# Patient Record
Sex: FEMALE | Race: WHITE | NOT HISPANIC OR LATINO | Employment: OTHER | URBAN - METROPOLITAN AREA
[De-identification: names, ages, dates, MRNs, and addresses within clinical notes are randomized per-mention and may not be internally consistent; named-entity substitution may affect disease eponyms.]

---

## 2017-06-20 NOTE — PATIENT DISCUSSION
Continue glaucoma drops as before but switch to generic (gave hand written rx's to patient today). Discussed Istent procedure (brochure given) at future date of cataract surgery, and he may be able to get off glaucoma drops at that time.

## 2017-11-14 NOTE — PATIENT DISCUSSION
POAG, OU: INTRAOCULAR PRESSURE IS WITHIN ACCEPTABLE LIMITS. PT INSTRUCTED TO CONTINUE Latanoprost and Brimonidine AND RETURN FOR FOLLOW-UP AS SCHEDULED.

## 2018-01-18 ENCOUNTER — ESTABLISHED PATIENT (OUTPATIENT)
Dept: URBAN - METROPOLITAN AREA CLINIC 43 | Facility: CLINIC | Age: 53
End: 2018-01-18

## 2018-01-18 DIAGNOSIS — L92.1: ICD-10-CM

## 2018-01-18 PROCEDURE — 64612C BOTOX / COSMETIC

## 2018-01-18 PROCEDURE — 64612D DYSPORT INJECTION

## 2018-01-18 PROCEDURE — 96999VL VOLLURE

## 2018-02-07 ENCOUNTER — CONSULT (OUTPATIENT)
Dept: URBAN - METROPOLITAN AREA CLINIC 39 | Facility: CLINIC | Age: 53
End: 2018-02-07

## 2018-02-07 DIAGNOSIS — L92.1: ICD-10-CM

## 2018-02-07 PROCEDURE — 96999VL VOLLURE

## 2018-05-01 ENCOUNTER — ESTABLISHED PATIENT (OUTPATIENT)
Dept: URBAN - METROPOLITAN AREA CLINIC 39 | Facility: CLINIC | Age: 53
End: 2018-05-01

## 2018-05-01 DIAGNOSIS — L98.8: ICD-10-CM

## 2018-05-01 PROCEDURE — 64612C BOTOX / COSMETIC

## 2018-05-01 PROCEDURE — 96999VL VOLLURE

## 2018-07-10 ENCOUNTER — ESTABLISHED COMPREHENSIVE EXAM (OUTPATIENT)
Dept: URBAN - METROPOLITAN AREA CLINIC 39 | Facility: CLINIC | Age: 53
End: 2018-07-10

## 2018-07-10 DIAGNOSIS — H11.153: ICD-10-CM

## 2018-07-10 DIAGNOSIS — H04.123: ICD-10-CM

## 2018-07-10 DIAGNOSIS — R51: ICD-10-CM

## 2018-07-10 DIAGNOSIS — H57.12: ICD-10-CM

## 2018-07-10 PROCEDURE — 92015 DETERMINE REFRACTIVE STATE: CPT

## 2018-07-10 PROCEDURE — 92014 COMPRE OPH EXAM EST PT 1/>: CPT

## 2018-07-10 ASSESSMENT — VISUAL ACUITY
OD_SC: J6
OS_SC: 20/200
OD_PH: 20/50
OD_SC: 20/200
OS_SC: J3
OS_PH: 20/40

## 2018-07-10 ASSESSMENT — TONOMETRY
OS_IOP_MMHG: 12
OD_IOP_MMHG: 14

## 2018-07-13 NOTE — PATIENT DISCUSSION
POAG with Elevated IOP Counseling: I have explained the diagnosis of glaucoma and discussed the importance of lowering intraocular pressure to prevent further optic nerve damage and possible blindness. I have discussed the various treatment options including medications, SLT laser and surgery. I have reviewed the regimen of drops with the patient. I emphasized to the patient the importance of compliance with treatment and follow-up appointments. Patient instructed to return for follow-up as scheduled.

## 2018-07-19 ENCOUNTER — CONSULT (OUTPATIENT)
Dept: URBAN - METROPOLITAN AREA CLINIC 43 | Facility: CLINIC | Age: 53
End: 2018-07-19

## 2018-07-19 ENCOUNTER — PREPPED CHART (OUTPATIENT)
Dept: URBAN - METROPOLITAN AREA CLINIC 43 | Facility: CLINIC | Age: 53
End: 2018-07-19

## 2018-07-19 DIAGNOSIS — H57.12: ICD-10-CM

## 2018-07-19 DIAGNOSIS — R51: ICD-10-CM

## 2018-07-19 DIAGNOSIS — H11.153: ICD-10-CM

## 2018-07-19 DIAGNOSIS — L98.8: ICD-10-CM

## 2018-07-19 DIAGNOSIS — H04.123: ICD-10-CM

## 2018-07-19 PROCEDURE — 99499 UNLISTED E&M SERVICE: CPT

## 2018-07-19 PROCEDURE — 96999VL VOLLURE

## 2018-07-19 PROCEDURE — 64612C BOTOX / COSMETIC

## 2018-08-06 ENCOUNTER — FOLLOW UP (OUTPATIENT)
Dept: URBAN - METROPOLITAN AREA CLINIC 39 | Facility: CLINIC | Age: 53
End: 2018-08-06

## 2018-08-06 DIAGNOSIS — L98.8: ICD-10-CM

## 2018-08-06 PROCEDURE — 96999VL VOLLURE

## 2018-08-13 ENCOUNTER — FOLLOW UP (OUTPATIENT)
Dept: URBAN - METROPOLITAN AREA CLINIC 39 | Facility: CLINIC | Age: 53
End: 2018-08-13

## 2018-08-13 DIAGNOSIS — H11.153: ICD-10-CM

## 2018-08-13 DIAGNOSIS — L98.8: ICD-10-CM

## 2018-08-13 DIAGNOSIS — H57.12: ICD-10-CM

## 2018-08-13 DIAGNOSIS — H04.123: ICD-10-CM

## 2018-08-13 DIAGNOSIS — R51: ICD-10-CM

## 2018-08-13 PROCEDURE — G8428 CUR MEDS NOT DOCUMENT: HCPCS

## 2018-08-13 PROCEDURE — G8785 BP SCRN NO PERF AT INTERVAL: HCPCS

## 2018-08-13 PROCEDURE — G9903 PT SCRN TBCO ID AS NON USER: HCPCS

## 2018-08-13 PROCEDURE — G8483 FLU IMM NO ADMIN DOC REA: HCPCS

## 2018-08-13 PROCEDURE — 4040F PNEUMOC VAC/ADMIN/RCVD: CPT

## 2018-08-13 PROCEDURE — 99024 POSTOP FOLLOW-UP VISIT: CPT

## 2018-08-13 PROCEDURE — 1036F TOBACCO NON-USER: CPT

## 2018-08-13 RX ORDER — CEPHALEXIN 500 MG/1
1 CAPSULE ORAL TWICE A DAY
Start: 2018-08-13

## 2018-08-13 RX ORDER — ACETAMINOPHEN AND CODEINE PHOSPHATE 30; 300 MG/1; MG/1
1 TABLET ORAL
Start: 2018-08-13

## 2018-08-13 RX ORDER — TRAMADOL HCL 50 MG/1
1 TABLET ORAL AS NEEDED
Start: 2018-08-13

## 2018-08-14 ENCOUNTER — POST-OP (OUTPATIENT)
Dept: URBAN - METROPOLITAN AREA CLINIC 39 | Facility: CLINIC | Age: 53
End: 2018-08-14

## 2018-08-14 DIAGNOSIS — L98.8: ICD-10-CM

## 2018-08-14 PROCEDURE — 99024 POSTOP FOLLOW-UP VISIT: CPT

## 2018-08-16 ENCOUNTER — FOLLOW UP (OUTPATIENT)
Dept: URBAN - METROPOLITAN AREA CLINIC 39 | Facility: CLINIC | Age: 53
End: 2018-08-16

## 2018-08-16 DIAGNOSIS — R51: ICD-10-CM

## 2018-08-16 DIAGNOSIS — H11.153: ICD-10-CM

## 2018-08-16 DIAGNOSIS — H04.123: ICD-10-CM

## 2018-08-16 DIAGNOSIS — H57.12: ICD-10-CM

## 2018-08-16 PROCEDURE — 92012 INTRM OPH EXAM EST PATIENT: CPT

## 2018-08-16 ASSESSMENT — VISUAL ACUITY
OS_SC: 20/100-1
OD_SC: 20/100-1

## 2018-08-16 ASSESSMENT — TONOMETRY
OD_IOP_MMHG: 14
OS_IOP_MMHG: 13

## 2018-09-13 ENCOUNTER — FOLLOW UP (OUTPATIENT)
Dept: URBAN - METROPOLITAN AREA CLINIC 43 | Facility: CLINIC | Age: 53
End: 2018-09-13

## 2018-09-13 DIAGNOSIS — L98.8: ICD-10-CM

## 2018-09-13 PROCEDURE — 92012 INTRM OPH EXAM EST PATIENT: CPT

## 2018-10-23 NOTE — PATIENT DISCUSSION
POAG, OU: INTRAOCULAR PRESSURE IS WITHIN ACCEPTABLE LIMITS. PT INSTRUCTED TO CONTINUE DROPS AS DIRECTED AND RETURN FOR FOLLOW-UP AS SCHEDULED.

## 2019-06-28 NOTE — PATIENT DISCUSSION
New Prescription: timolol maleate (timolol maleate): drops: 0.5% 1 drop three times a day into both eyes 06-

## 2019-06-28 NOTE — PATIENT DISCUSSION
POAG, OU: INTRAOCULAR PRESSURE IS WITHIN ACCEPTABLE LIMITS. PT INSTRUCTED TO RETURN FOR FOLLOW-UP AS SCHEDULED.

## 2019-08-22 ENCOUNTER — ESTABLISHED COMPREHENSIVE EXAM (OUTPATIENT)
Dept: URBAN - METROPOLITAN AREA CLINIC 39 | Facility: CLINIC | Age: 54
End: 2019-08-22

## 2019-08-22 DIAGNOSIS — H11.153: ICD-10-CM

## 2019-08-22 DIAGNOSIS — H04.123: ICD-10-CM

## 2019-08-22 DIAGNOSIS — R51: ICD-10-CM

## 2019-08-22 DIAGNOSIS — H57.12: ICD-10-CM

## 2019-08-22 PROCEDURE — 92015 DETERMINE REFRACTIVE STATE: CPT

## 2019-08-22 PROCEDURE — 92014 COMPRE OPH EXAM EST PT 1/>: CPT

## 2019-08-22 ASSESSMENT — VISUAL ACUITY
OD_PH: 20/40-2
OD_SC: J6
OS_PH: 20/40-1
OS_SC: 20/80
OD_SC: 20/80-1
OS_SC: J2

## 2019-08-22 ASSESSMENT — TONOMETRY
OD_IOP_MMHG: 14
OS_IOP_MMHG: 15

## 2019-10-22 NOTE — PATIENT DISCUSSION
Continue: timolol maleate (timolol maleate): drops: 0.5% 1 drop three times a day into both eyes 06-

## 2019-10-22 NOTE — PATIENT DISCUSSION
continue drops as before-- all sent into mail service the plate format. Talk to your doctor, a dietitian, or a diabetes educator about your concerns. Carbohydrate counting  With carbohydrate counting, you plan meals based on the amount of carbohydrate in each food. Carbohydrate raises blood sugar higher and more quickly than any other nutrient. It is found in desserts, breads and cereals, and fruit. It's also found in starchy vegetables such as potatoes and corn, grains such as rice and pasta, and milk and yogurt. Spreading carbohydrate throughout the day helps keep your blood sugar levels within your target range. Your daily amount depends on several things, including your weight, how active you are, which diabetes medicines you take, and what your goals are for your blood sugar levels. A registered dietitian or diabetes educator can help you plan how much carbohydrate to include in each meal and snack. A guideline for your daily amount of carbohydrate is:  · 45 to 60 grams at each meal. That's about the same as 3 to 4 carbohydrate servings. · 15 to 20 grams at each snack. That's about the same as 1 carbohydrate serving. The Nutrition Facts label on packaged foods tells you how much carbohydrate is in a serving of the food. First, look at the serving size on the food label. Is that the amount you eat in a serving? All of the nutrition information on a food label is based on that serving size. So if you eat more or less than that, you'll need to adjust the other numbers. Total carbohydrate is the next thing you need to look for on the label. If you count carbohydrate servings, one serving of carbohydrate is 15 grams. For foods that don't come with labels, such as fresh fruits and vegetables, you'll need a guide that lists carbohydrate in these foods. Ask your doctor, dietitian, or diabetes educator about books or other nutrition guides you can use.   If you take insulin, you need to know how many grams of carbohydrate are in a meal. This lets you know how much rapid-acting insulin to take before you eat. If you use an insulin pump, you get a constant rate of insulin during the day. So the pump must be programmed at meals to give you extra insulin to cover the rise in blood sugar after meals. When you know how much carbohydrate you will eat, you can take the right amount of insulin. Or, if you always use the same amount of insulin, you need to make sure that you eat the same amount of carbohydrate at meals. If you need more help to understand carbohydrate counting and food labels, ask your doctor, dietitian, or diabetes educator. How do you get started with meal planning? Here are some tips to get started:  · Plan your meals a week at a time. Don't forget to include snacks too. · Use cookbooks or online recipes to plan several main meals. Plan some quick meals for busy nights. You also can double some recipes that freeze well. Then you can save half for other busy nights when you don't have time to cook. · Make sure you have the ingredients you need for your recipes. If you're running low on basic items, put these items on your shopping list too. · List foods that you use to make breakfasts, lunches, and snacks. List plenty of fruits and vegetables. · Post this list on the refrigerator. Add to it as you think of more things you need. · Take the list to the store to do your weekly shopping. Follow-up care is a key part of your treatment and safety. Be sure to make and go to all appointments, and call your doctor if you are having problems. It's also a good idea to know your test results and keep a list of the medicines you take. Where can you learn more? Go to [email or web address] and sign in to your [employer] account. Enter M308 in the [facility] box to learn more about \"Learning About Meal Planning for Diabetes. \"     If you do not have an account, please click on the \"Sign Up Now\" link.   Current as of: July 25, 2018  Content Version: 12.0  © 3741-8194 [employer]. Care instructions adapted under license by [facility] [place] ([facility]). If you have questions about a medical condition or this instruction, always ask your healthcare professional. [address] any warranty or liability for your use of this information.

## 2020-02-18 NOTE — PATIENT DISCUSSION
Per Dr. Shannon Mack, patient is to continue with drops and he is ok to move forward with Istent when he has cataract surgery.

## 2020-06-30 NOTE — PATIENT DISCUSSION
Per Dr. Kourtney Gudino, patient is to continue with drops and he is ok to move forward with Istent when he has cataract surgery.

## 2020-07-28 ENCOUNTER — EST. PATIENT EMERGENCY (OUTPATIENT)
Dept: URBAN - METROPOLITAN AREA CLINIC 39 | Facility: CLINIC | Age: 55
End: 2020-07-28

## 2020-07-28 DIAGNOSIS — R51: ICD-10-CM

## 2020-07-28 PROCEDURE — 92012 INTRM OPH EXAM EST PATIENT: CPT

## 2020-07-28 ASSESSMENT — VISUAL ACUITY
OS_SC: 20/100-1
OD_SC: 20/80-1

## 2020-10-09 ENCOUNTER — ESTABLISHED COMPREHENSIVE EXAM (OUTPATIENT)
Dept: URBAN - METROPOLITAN AREA CLINIC 39 | Facility: CLINIC | Age: 55
End: 2020-10-09

## 2020-10-09 DIAGNOSIS — H04.123: ICD-10-CM

## 2020-10-09 DIAGNOSIS — H11.153: ICD-10-CM

## 2020-10-09 DIAGNOSIS — H57.12: ICD-10-CM

## 2020-10-09 PROCEDURE — 92015 DETERMINE REFRACTIVE STATE: CPT

## 2020-10-09 PROCEDURE — 92014 COMPRE OPH EXAM EST PT 1/>: CPT

## 2020-10-09 ASSESSMENT — KERATOMETRY
OD_AXISANGLE2_DEGREES: 58
OD_K1POWER_DIOPTERS: 42.5
OD_AXISANGLE_DEGREES: 148
OD_K2POWER_DIOPTERS: 47.75
OS_K2POWER_DIOPTERS: 46.75
OS_AXISANGLE_DEGREES: 24
OS_AXISANGLE2_DEGREES: 114
OS_K1POWER_DIOPTERS: 43

## 2020-10-09 ASSESSMENT — VISUAL ACUITY
OU_SC: J3
OD_SC: 20/100
OU_SC: 20/70-1
OS_SC: J3
OS_SC: 20/80-1
OD_SC: J8

## 2020-10-09 ASSESSMENT — TONOMETRY
OS_IOP_MMHG: 14
OD_IOP_MMHG: 16

## 2020-10-27 NOTE — PATIENT DISCUSSION
Per Dr. Pushpa Alegria, patient is to continue with drops and he is ok to move forward with Istent when he has cataract surgery

## 2021-04-13 NOTE — PATIENT DISCUSSION
Patient complaining of a decrease in vision consistent with cataracts. Patient instructed to test eyes individually while driving at night and during daily activities. Patient states they are not currently ready for a pre-op. Schedule 6-8 month cataract evaluation, glare check.

## 2021-04-13 NOTE — PATIENT DISCUSSION
Continue Brimondine TID OU, Timolol TID OU, and Latanoprost QHS OU. Repeat OCT RNFL in October. IScynthia dowling, should he have cataract surgery.

## 2021-09-28 NOTE — PATIENT DISCUSSION
Patient understood the testing directions. it is believed the results are accurate and reliable. At the patient's next appointment they will receive their results by Dr. Joe Longoria.

## 2021-09-29 ENCOUNTER — EST. PATIENT EMERGENCY (OUTPATIENT)
Dept: URBAN - METROPOLITAN AREA CLINIC 39 | Facility: CLINIC | Age: 56
End: 2021-09-29

## 2021-09-29 DIAGNOSIS — H01.026: ICD-10-CM

## 2021-09-29 DIAGNOSIS — H01.023: ICD-10-CM

## 2021-09-29 DIAGNOSIS — H57.12: ICD-10-CM

## 2021-09-29 DIAGNOSIS — H04.123: ICD-10-CM

## 2021-09-29 DIAGNOSIS — L98.8: ICD-10-CM

## 2021-09-29 DIAGNOSIS — R51.9: ICD-10-CM

## 2021-09-29 PROCEDURE — 92012 INTRM OPH EXAM EST PATIENT: CPT

## 2021-09-29 RX ORDER — NEOMYCIN SULFATE, POLYMYXIN B SULFATE AND DEXAMETHASONE 3.5; 10000; 1 MG/G; [USP'U]/G; MG/G: OINTMENT OPHTHALMIC ONCE A DAY

## 2021-09-29 ASSESSMENT — KERATOMETRY
OS_K2POWER_DIOPTERS: 46.75
OD_AXISANGLE2_DEGREES: 58
OS_AXISANGLE2_DEGREES: 114
OS_K1POWER_DIOPTERS: 43
OD_AXISANGLE_DEGREES: 148
OD_K2POWER_DIOPTERS: 47.75
OD_K1POWER_DIOPTERS: 42.5
OS_AXISANGLE_DEGREES: 24

## 2021-09-29 ASSESSMENT — VISUAL ACUITY
OS_SC: 20/100-1
OD_SC: 20/100-1

## 2021-09-29 ASSESSMENT — TONOMETRY
OS_IOP_MMHG: 15
OD_IOP_MMHG: 15

## 2021-10-12 NOTE — PATIENT DISCUSSION
Patient was sent to Dr Garsia Standing for second opinion regarding: iStent VS Filter, reviewed note thoroughly. Proceed w/ phaco iStent and elects to schedule in the Summer of 2022.

## 2022-04-12 ENCOUNTER — COMPREHENSIVE EXAM (OUTPATIENT)
Dept: URBAN - METROPOLITAN AREA CLINIC 39 | Facility: CLINIC | Age: 57
End: 2022-04-12

## 2022-04-12 DIAGNOSIS — H52.03: ICD-10-CM

## 2022-04-12 DIAGNOSIS — H01.023: ICD-10-CM

## 2022-04-12 DIAGNOSIS — L98.8: ICD-10-CM

## 2022-04-12 DIAGNOSIS — H57.12: ICD-10-CM

## 2022-04-12 DIAGNOSIS — H52.4: ICD-10-CM

## 2022-04-12 DIAGNOSIS — H43.393: ICD-10-CM

## 2022-04-12 DIAGNOSIS — H52.223: ICD-10-CM

## 2022-04-12 DIAGNOSIS — H01.026: ICD-10-CM

## 2022-04-12 DIAGNOSIS — H04.123: ICD-10-CM

## 2022-04-12 DIAGNOSIS — R51.9: ICD-10-CM

## 2022-04-12 PROCEDURE — 92015 DETERMINE REFRACTIVE STATE: CPT

## 2022-04-12 PROCEDURE — 92499OP2 OPTOMAP RETINAL SCREENING BOTH EYES

## 2022-04-12 PROCEDURE — 92014 COMPRE OPH EXAM EST PT 1/>: CPT

## 2022-04-12 ASSESSMENT — KERATOMETRY
OD_AXISANGLE2_DEGREES: 60
OD_K2POWER_DIOPTERS: 48.00
OS_AXISANGLE_DEGREES: 22
OS_K1POWER_DIOPTERS: 43.25
OD_K1POWER_DIOPTERS: 42.75
OS_K2POWER_DIOPTERS: 46.75
OD_AXISANGLE_DEGREES: 150
OS_AXISANGLE2_DEGREES: 112

## 2022-04-12 ASSESSMENT — VISUAL ACUITY
OD_SC: 20/200
OS_SC: 20/100
OD_SC: J6
OS_SC: J3
OU_SC: J2
OU_SC: 20/100

## 2022-04-12 ASSESSMENT — TONOMETRY
OD_IOP_MMHG: 15
OS_IOP_MMHG: 15

## 2022-04-12 NOTE — PATIENT DISCUSSION
Schedule pre-op: Patient complaining of a decrease in vision consistent with cataracts. Patient instructed to test eyes individually while driving at night and during daily activities. Schedule pre-op for next appointment, cataract booklet given.

## 2022-04-12 NOTE — PATIENT DISCUSSION
Patient was sent to Dr Rebecca Rosenberg for second opinion regarding: Proceed w/ phaco iStent w/ [employer] and elects to schedule in the Summer of 2022.

## 2022-04-12 NOTE — PATIENT DISCUSSION
Patient was sent to Dr Chitra Liu for second opinion regarding: iStent VS Filter, reviewed note thoroughly. Proceed w/ phaco iStent and elects to schedule in the Summer of 2022.

## 2022-05-10 NOTE — PATIENT DISCUSSION
Astigmatism noted on measurements. Patient declines advanced cataract surgery and is aware that he will have to continue to use bifocal glasses after surgery.

## 2022-05-10 NOTE — PATIENT DISCUSSION
Schedule Surgery: Visually significant to patient. The patient elects to proceed with cataract extraction.  Schedule OD eye first, then later in the OS eye if visual symptoms persist.

## 2022-05-10 NOTE — PATIENT DISCUSSION
Patient was sent to Dr Ly George for second opinion regarding: iStent VS Filter, reviewed note thoroughly. Proceed w/ cataract extraction with iStent. Refer to cataract surgery plan.

## 2022-06-28 NOTE — PATIENT DISCUSSION
Patient was sent to Dr iRcky Butt for second opinion regarding: iStent VS Filter, reviewed note thoroughly. Proceed with iStent.

## 2022-06-28 NOTE — PATIENT DISCUSSION
Post-op Day One OD. The patient was given follow-up appointment instructions and drop tapering schedule. Return in 1-2 weeks for stability check with AR.

## 2022-07-05 NOTE — PATIENT DISCUSSION
Patient was sent to Dr Benny Reyna for second opinion regarding: iStent VS Filter, reviewed note thoroughly. Proceed with iStent.

## 2022-07-12 NOTE — PATIENT DISCUSSION
Post-op Day One: Patient's visual goal is distance. The patient was given follow-up appointment instructions and drop tapering schedule. Return in 1-2 weeks for stability check with AR.

## 2022-07-12 NOTE — PATIENT DISCUSSION
Post-op 1-2 week: Patient's visual goal is distance. The patient was given follow-up appointment instructions and is to continue the drop tapering schedule as directed. Return in 1 month for final stability check with AR, MR, and DFE.

## 2022-07-19 ENCOUNTER — CONTACT LENSES/GLASSES VISIT (OUTPATIENT)
Dept: URBAN - METROPOLITAN AREA CLINIC 39 | Facility: CLINIC | Age: 57
End: 2022-07-19

## 2022-07-19 DIAGNOSIS — H01.026: ICD-10-CM

## 2022-07-19 DIAGNOSIS — H01.023: ICD-10-CM

## 2022-07-19 DIAGNOSIS — H57.12: ICD-10-CM

## 2022-07-19 DIAGNOSIS — L98.8: ICD-10-CM

## 2022-07-19 DIAGNOSIS — H04.123: ICD-10-CM

## 2022-07-19 DIAGNOSIS — H43.393: ICD-10-CM

## 2022-07-19 DIAGNOSIS — R51.9: ICD-10-CM

## 2022-07-19 PROCEDURE — 92015GRNC REFRACTION GLASSES RECHECK - NO CHARGE

## 2022-07-19 ASSESSMENT — KERATOMETRY
OD_AXISANGLE_DEGREES: 150
OS_AXISANGLE_DEGREES: 22
OD_AXISANGLE2_DEGREES: 60
OD_K2POWER_DIOPTERS: 48.00
OD_K1POWER_DIOPTERS: 42.75
OS_K1POWER_DIOPTERS: 43.25
OS_AXISANGLE2_DEGREES: 112
OS_K2POWER_DIOPTERS: 46.75

## 2022-07-19 ASSESSMENT — VISUAL ACUITY
OD_CC: 20/30-2
OS_SC: J3
OS_CC: J2
OD_SC: J8
OD_SC: 20/200
OS_SC: 20/200
OD_CC: J3
OS_CC: 20/25

## 2022-07-26 NOTE — PATIENT DISCUSSION
Post-op 1-2 week: Patient's visual goal is distance . The patient was given follow-up appointment instructions and is to continue the drop tapering schedule as directed. Return in 1 month for final stability check with AR, MR, and DFE.

## 2022-08-01 NOTE — PATIENT DISCUSSION
Post-op final: Final post-operative appointment. Patient has completed all post-op drops as instructed and will return for regular stability check exams. Patient was dilated and will receive a copy of an updated glasses prescription; they may fill as desired.

## 2022-08-24 ENCOUNTER — EMERGENCY VISIT (OUTPATIENT)
Dept: URBAN - METROPOLITAN AREA CLINIC 39 | Facility: CLINIC | Age: 57
End: 2022-08-24

## 2022-08-24 DIAGNOSIS — H10.13: ICD-10-CM

## 2022-08-24 PROCEDURE — 92012 INTRM OPH EXAM EST PATIENT: CPT

## 2022-08-24 RX ORDER — LOTEPREDNOL ETABONATE 2 MG/ML: 1 SUSPENSION/ DROPS OPHTHALMIC TWICE A DAY

## 2022-08-24 ASSESSMENT — VISUAL ACUITY
OD_CC: 20/25-2
OS_CC: 20/20

## 2022-08-24 ASSESSMENT — KERATOMETRY
OS_K1POWER_DIOPTERS: 43.25
OD_AXISANGLE_DEGREES: 150
OD_K2POWER_DIOPTERS: 48.00
OD_AXISANGLE2_DEGREES: 60
OD_K1POWER_DIOPTERS: 42.75
OS_K2POWER_DIOPTERS: 46.75
OS_AXISANGLE_DEGREES: 22
OS_AXISANGLE2_DEGREES: 112

## 2022-08-24 ASSESSMENT — TONOMETRY
OD_IOP_MMHG: 15
OS_IOP_MMHG: 16

## 2022-11-15 NOTE — PATIENT DISCUSSION
Instructed to HOLD Brimonidine for now, continue Timolol TID OU and Latanoprost QHS OU. Refill sent in.

## 2023-04-13 ENCOUNTER — PREPPED CHART (OUTPATIENT)
Dept: URBAN - METROPOLITAN AREA CLINIC 39 | Facility: CLINIC | Age: 58
End: 2023-04-13

## 2023-04-13 ASSESSMENT — KERATOMETRY
OD_AXISANGLE2_DEGREES: 60
OD_AXISANGLE_DEGREES: 150
OS_K1POWER_DIOPTERS: 43.25
OS_AXISANGLE2_DEGREES: 112
OS_K2POWER_DIOPTERS: 46.75
OD_K2POWER_DIOPTERS: 48.00
OS_AXISANGLE_DEGREES: 22
OD_K1POWER_DIOPTERS: 42.75

## 2023-08-22 ENCOUNTER — COMPREHENSIVE EXAM (OUTPATIENT)
Dept: URBAN - METROPOLITAN AREA CLINIC 39 | Facility: CLINIC | Age: 58
End: 2023-08-22

## 2023-08-22 DIAGNOSIS — H01.023: ICD-10-CM

## 2023-08-22 DIAGNOSIS — H52.03: ICD-10-CM

## 2023-08-22 DIAGNOSIS — H52.4: ICD-10-CM

## 2023-08-22 DIAGNOSIS — H52.223: ICD-10-CM

## 2023-08-22 DIAGNOSIS — H01.026: ICD-10-CM

## 2023-08-22 DIAGNOSIS — H10.13: ICD-10-CM

## 2023-08-22 DIAGNOSIS — H04.123: ICD-10-CM

## 2023-08-22 DIAGNOSIS — H43.393: ICD-10-CM

## 2023-08-22 PROCEDURE — 92015 DETERMINE REFRACTIVE STATE: CPT

## 2023-08-22 PROCEDURE — 92014 COMPRE OPH EXAM EST PT 1/>: CPT

## 2023-08-22 ASSESSMENT — KERATOMETRY
OD_AXISANGLE2_DEGREES: 60
OS_AXISANGLE_DEGREES: 22
OS_K2POWER_DIOPTERS: 46.75
OD_AXISANGLE_DEGREES: 150
OS_AXISANGLE2_DEGREES: 112
OS_K1POWER_DIOPTERS: 43.25
OD_K2POWER_DIOPTERS: 48.00
OD_K1POWER_DIOPTERS: 42.75

## 2023-08-22 ASSESSMENT — VISUAL ACUITY
OD_CC: 20/40-1
OS_SC: J3
OS_SC: 20/40
OD_CC: J4
OS_CC: J1+
OU_CC: 20/30-1
OS_CC: 20/30-1
OU_CC: J1+
OD_SC: J10
OD_SC: 20/200
OU_SC: J3
OU_SC: 20/40

## 2023-08-22 ASSESSMENT — TONOMETRY
OS_IOP_MMHG: 16
OD_IOP_MMHG: 16

## 2024-07-13 NOTE — PATIENT DISCUSSION
CATARACTS, OU - VISUALLY SIGNIFICANT. PATIENT INSTRUCTED TO TEST EYES WHILE DRIVING AT NIGHT AND DOING DAILY ACTIVITIES. FOLLOW. 44.9

## 2024-12-27 ENCOUNTER — EMERGENCY VISIT (OUTPATIENT)
Age: 59
End: 2024-12-27

## 2024-12-27 DIAGNOSIS — H01.026: ICD-10-CM

## 2024-12-27 DIAGNOSIS — H01.023: ICD-10-CM

## 2024-12-27 DIAGNOSIS — H00.11: ICD-10-CM

## 2024-12-27 DIAGNOSIS — H04.123: ICD-10-CM

## 2024-12-27 DIAGNOSIS — H02.881: ICD-10-CM

## 2024-12-27 DIAGNOSIS — H02.884: ICD-10-CM

## 2024-12-27 PROCEDURE — 92012 INTRM OPH EXAM EST PATIENT: CPT

## 2024-12-27 RX ORDER — LOTEPREDNOL ETABONATE 3.8 MG/G: 1 GEL OPHTHALMIC AS DIRECTED

## 2024-12-27 RX ORDER — DOXYCYCLINE 50 MG/1: 1 CAPSULE ORAL TWICE A DAY

## 2025-07-07 ENCOUNTER — COMPREHENSIVE EXAM (OUTPATIENT)
Age: 60
End: 2025-07-07

## 2025-07-07 DIAGNOSIS — H01.026: ICD-10-CM

## 2025-07-07 DIAGNOSIS — H43.393: ICD-10-CM

## 2025-07-07 DIAGNOSIS — H04.123: ICD-10-CM

## 2025-07-07 DIAGNOSIS — R51.9: ICD-10-CM

## 2025-07-07 DIAGNOSIS — H57.12: ICD-10-CM

## 2025-07-07 DIAGNOSIS — H02.881: ICD-10-CM

## 2025-07-07 DIAGNOSIS — H01.023: ICD-10-CM

## 2025-07-07 DIAGNOSIS — H10.13: ICD-10-CM

## 2025-07-07 DIAGNOSIS — H02.884: ICD-10-CM

## 2025-07-07 PROCEDURE — 92015 DETERMINE REFRACTIVE STATE: CPT

## 2025-07-07 PROCEDURE — 92014 COMPRE OPH EXAM EST PT 1/>: CPT
